# Patient Record
Sex: MALE | Race: WHITE | Employment: FULL TIME
[De-identification: names, ages, dates, MRNs, and addresses within clinical notes are randomized per-mention and may not be internally consistent; named-entity substitution may affect disease eponyms.]

---

## 2017-01-27 ENCOUNTER — OFFICE VISIT (OUTPATIENT)
Dept: FAMILY MEDICINE CLINIC | Facility: CLINIC | Age: 52
End: 2017-01-27

## 2017-01-27 VITALS
DIASTOLIC BLOOD PRESSURE: 88 MMHG | WEIGHT: 169 LBS | HEART RATE: 77 BPM | SYSTOLIC BLOOD PRESSURE: 120 MMHG | OXYGEN SATURATION: 98 % | BODY MASS INDEX: 24.25 KG/M2 | RESPIRATION RATE: 16 BRPM

## 2017-01-27 DIAGNOSIS — I10 ESSENTIAL HYPERTENSION: ICD-10-CM

## 2017-01-27 DIAGNOSIS — J02.9 PHARYNGITIS, UNSPECIFIED ETIOLOGY: Primary | ICD-10-CM

## 2017-01-27 PROCEDURE — 99213 OFFICE O/P EST LOW 20 MIN: CPT | Performed by: FAMILY MEDICINE

## 2017-01-27 RX ORDER — AMOXICILLIN 875 MG/1
875 TABLET, COATED ORAL 2 TIMES DAILY
Qty: 20 TABLET | Refills: 0 | Status: SHIPPED | OUTPATIENT
Start: 2017-01-27 | End: 2017-02-06

## 2017-01-27 RX ORDER — AMLODIPINE BESYLATE 5 MG/1
TABLET ORAL
Qty: 30 TABLET | Refills: 11 | Status: SHIPPED | OUTPATIENT
Start: 2017-01-27 | End: 2018-01-17 | Stop reason: SDUPTHER

## 2017-01-27 RX ORDER — ATORVASTATIN CALCIUM 10 MG/1
TABLET, FILM COATED ORAL
Qty: 30 TABLET | Refills: 11 | Status: SHIPPED | OUTPATIENT
Start: 2017-01-27 | End: 2018-01-17 | Stop reason: SDUPTHER

## 2017-01-27 RX ORDER — VARDENAFIL HYDROCHLORIDE 20 MG/1
TABLET ORAL
Qty: 30 TABLET | Refills: 11 | Status: SHIPPED | OUTPATIENT
Start: 2017-01-27 | End: 2018-05-15 | Stop reason: SDUPTHER

## 2017-01-27 RX ORDER — ENALAPRIL MALEATE 20 MG/1
20 TABLET ORAL DAILY
Qty: 30 TABLET | Refills: 11 | Status: SHIPPED | OUTPATIENT
Start: 2017-01-27 | End: 2018-01-17 | Stop reason: SDUPTHER

## 2017-01-27 ASSESSMENT — PATIENT HEALTH QUESTIONNAIRE - PHQ9
SUM OF ALL RESPONSES TO PHQ QUESTIONS 1-9: 0
2. FEELING DOWN, DEPRESSED OR HOPELESS: 0
SUM OF ALL RESPONSES TO PHQ9 QUESTIONS 1 & 2: 0
1. LITTLE INTEREST OR PLEASURE IN DOING THINGS: 0

## 2017-02-24 ENCOUNTER — OFFICE VISIT (OUTPATIENT)
Dept: FAMILY MEDICINE CLINIC | Facility: CLINIC | Age: 52
End: 2017-02-24

## 2017-02-24 VITALS
SYSTOLIC BLOOD PRESSURE: 130 MMHG | DIASTOLIC BLOOD PRESSURE: 90 MMHG | OXYGEN SATURATION: 98 % | BODY MASS INDEX: 24.54 KG/M2 | WEIGHT: 171 LBS | HEART RATE: 75 BPM

## 2017-02-24 DIAGNOSIS — M54.41 ACUTE RIGHT-SIDED LOW BACK PAIN WITH RIGHT-SIDED SCIATICA: Primary | ICD-10-CM

## 2017-02-24 PROCEDURE — 99213 OFFICE O/P EST LOW 20 MIN: CPT | Performed by: FAMILY MEDICINE

## 2017-02-24 RX ORDER — DICLOFENAC SODIUM 75 MG/1
75 TABLET, DELAYED RELEASE ORAL 2 TIMES DAILY WITH MEALS
Qty: 60 TABLET | Refills: 11 | Status: SHIPPED | OUTPATIENT
Start: 2017-02-24 | End: 2018-01-11 | Stop reason: ALTCHOICE

## 2017-02-24 RX ORDER — CYCLOBENZAPRINE HCL 10 MG
10 TABLET ORAL NIGHTLY PRN
Qty: 30 TABLET | Refills: 1 | Status: SHIPPED | OUTPATIENT
Start: 2017-02-24 | End: 2017-03-26

## 2018-01-11 ENCOUNTER — OFFICE VISIT (OUTPATIENT)
Dept: FAMILY MEDICINE CLINIC | Age: 53
End: 2018-01-11
Payer: COMMERCIAL

## 2018-01-11 VITALS
HEART RATE: 68 BPM | SYSTOLIC BLOOD PRESSURE: 104 MMHG | BODY MASS INDEX: 24.25 KG/M2 | WEIGHT: 169 LBS | DIASTOLIC BLOOD PRESSURE: 80 MMHG | OXYGEN SATURATION: 97 %

## 2018-01-11 DIAGNOSIS — K57.20 DIVERTICULITIS OF LARGE INTESTINE WITH PERFORATION WITHOUT ABSCESS OR BLEEDING: ICD-10-CM

## 2018-01-11 DIAGNOSIS — I10 ESSENTIAL HYPERTENSION: Primary | ICD-10-CM

## 2018-01-11 PROCEDURE — 99214 OFFICE O/P EST MOD 30 MIN: CPT | Performed by: FAMILY MEDICINE

## 2018-01-11 NOTE — PROGRESS NOTES
quadrants which are normoactive. No bruits noted. No masses could be palpated. No unusual pulsatile masses noted. To deep palpation, patient denied any significant pain. No rebound, guarding or rigidity noted to my exam.        Assessment:   No orders of the defined types were placed in this encounter. Encounter Diagnoses   Name Primary?  Essential hypertension Yes    Diverticulitis of large intestine with perforation without abscess or bleeding          Plan:   Need the colonoscopy report , ct of abd and labs.     hadnout given for  diseaes

## 2018-01-11 NOTE — PATIENT INSTRUCTIONS
fiber supplement, such as Citrucel or Metamucil, every day if needed. Read and follow all instructions on the label. · Schedule time each day for a bowel movement. Having a daily routine may help. Take your time and do not strain when having a bowel movement. Some people avoid nuts, seeds, berries, and popcorn. They believe that these foods might get trapped in the diverticula and cause pain. But there is no proof that these foods cause diverticulitis or make it worse. How are these problems treated? · The best way to treat diverticulosis is to avoid constipation. (See the tips above.)  · Treatment for diverticulitis includes antibiotics and often a change in your diet. You may need only liquids at first. Your doctor may suggest pain medicines for pain or belly cramps. In some cases, surgery may be needed. Follow-up care is a key part of your treatment and safety. Be sure to make and go to all appointments, and call your doctor if you are having problems. It's also a good idea to know your test results and keep a list of the medicines you take. Where can you learn more? Go to [email or web address]://[email or web address]. org and sign in to your [employer] account. Enter Z168 in the [facility] box to learn more about \"Learning About Diverticulosis and Diverticulitis. \"     If you do not have an account, please click on the \"Sign Up Now\" link. Current as of: May 12, 2017  Content Version: 11.5  © 4267-3183 [employer]wise, [employer]. Care instructions adapted under license by [facility] ([facility]). If you have questions about a medical condition or this instruction, always ask your healthcare professional. [place] [address] any warranty or liability for your use of this information.

## 2018-01-17 RX ORDER — ATORVASTATIN CALCIUM 10 MG/1
TABLET, FILM COATED ORAL
Qty: 60 TABLET | Refills: 10 | Status: SHIPPED | OUTPATIENT
Start: 2018-01-17 | End: 2019-01-31 | Stop reason: SDUPTHER

## 2018-01-17 RX ORDER — ENALAPRIL MALEATE 20 MG/1
TABLET ORAL
Qty: 30 TABLET | Refills: 11 | Status: SHIPPED | OUTPATIENT
Start: 2018-01-17 | End: 2019-01-08 | Stop reason: SDUPTHER

## 2018-01-17 RX ORDER — AMLODIPINE BESYLATE 5 MG/1
TABLET ORAL
Qty: 30 TABLET | Refills: 11 | Status: SHIPPED | OUTPATIENT
Start: 2018-01-17 | End: 2019-01-08 | Stop reason: SDUPTHER

## 2018-01-17 NOTE — TELEPHONE ENCOUNTER
LV - 1/11/18    Next Visit Date:  No future appointments.     Health Maintenance   Topic Date Due    Hepatitis C screen  1965    HIV screen  08/12/1978    Potassium monitoring  04/08/2017    Creatinine monitoring  04/08/2017    Flu vaccine (1) 01/27/2018 (Originally 9/1/2017)    Lipid screen  04/08/2021    DTaP/Tdap/Td vaccine (2 - Td) 04/08/2026    Colon cancer screen colonoscopy  04/08/2026       No results found for: LABA1C          ( goal A1C is < 7)   No results found for: LABMICR  LDL Cholesterol (mg/dL)   Date Value   12/12/2014 109     LDL Calculated (mg/dL)   Date Value   04/08/2016 101       (goal LDL is <100)   AST (U/L)   Date Value   12/12/2014 21     ALT (U/L)   Date Value   12/12/2014 21     BUN (mg/dL)   Date Value   12/12/2014 22 (H)     BP Readings from Last 3 Encounters:   01/11/18 104/80   02/24/17 130/90   01/27/17 120/88          (goal 120/80)    All Future Testing planned in [facility]  Lab Frequency Next Occurrence   CBC Auto Differential Once 01/27/2018   Comprehensive Metabolic Panel Once 21/98/4897   Lipid Panel Once 01/27/2018               Patient Active Problem List:     ED (erectile dysfunction)     Anxiety     HTN (hypertension)     Hyperlipidemia

## 2018-03-15 ENCOUNTER — TELEPHONE (OUTPATIENT)
Dept: FAMILY MEDICINE CLINIC | Age: 53
End: 2018-03-15

## 2018-03-15 NOTE — TELEPHONE ENCOUNTER
The patient was calling in to see what you thoughts were on his Diverticulitis and his CT scan of the abdomen and the Colonoscopy from a few years back. He had the CT at [facility] and he said you advised him you would look at the imaging and call him and let him know what your thoughts were regarding the dx. Please advise.

## 2018-05-15 RX ORDER — VARDENAFIL HCL 20 MG
TABLET ORAL
Qty: 30 TABLET | Refills: 11 | Status: SHIPPED | OUTPATIENT
Start: 2018-05-15 | End: 2019-06-10 | Stop reason: SDUPTHER

## 2018-06-29 ENCOUNTER — TELEPHONE (OUTPATIENT)
Dept: FAMILY MEDICINE CLINIC | Age: 53
End: 2018-06-29

## 2018-06-29 RX ORDER — DICLOFENAC SODIUM 75 MG/1
75 TABLET, DELAYED RELEASE ORAL 2 TIMES DAILY WITH MEALS
Qty: 60 TABLET | Refills: 11 | Status: SHIPPED | OUTPATIENT
Start: 2018-06-29

## 2018-06-29 RX ORDER — DOXYCYCLINE 100 MG/1
100 CAPSULE ORAL 2 TIMES DAILY WITH MEALS
Qty: 20 CAPSULE | Refills: 0 | Status: SHIPPED | OUTPATIENT
Start: 2018-06-29

## 2018-07-02 ENCOUNTER — TELEPHONE (OUTPATIENT)
Dept: FAMILY MEDICINE CLINIC | Age: 53
End: 2018-07-02

## 2018-07-02 NOTE — TELEPHONE ENCOUNTER
Patient discharged from [facility] on 7/2/18. Follow up appointment with Dr Kiera Rae is scheduled 7/11/18. Please request medical records for the upcoming appointment.

## 2018-07-11 ENCOUNTER — OFFICE VISIT (OUTPATIENT)
Dept: FAMILY MEDICINE CLINIC | Age: 53
End: 2018-07-11
Payer: COMMERCIAL

## 2018-07-11 VITALS
OXYGEN SATURATION: 97 % | WEIGHT: 170 LBS | SYSTOLIC BLOOD PRESSURE: 130 MMHG | HEIGHT: 69 IN | BODY MASS INDEX: 25.18 KG/M2 | HEART RATE: 96 BPM | DIASTOLIC BLOOD PRESSURE: 78 MMHG

## 2018-07-11 DIAGNOSIS — L03.113 CELLULITIS OF RIGHT UPPER EXTREMITY: Primary | ICD-10-CM

## 2018-07-11 PROCEDURE — 99213 OFFICE O/P EST LOW 20 MIN: CPT | Performed by: FAMILY MEDICINE

## 2018-07-11 ASSESSMENT — PATIENT HEALTH QUESTIONNAIRE - PHQ9
SUM OF ALL RESPONSES TO PHQ QUESTIONS 1-9: 0
2. FEELING DOWN, DEPRESSED OR HOPELESS: 0
1. LITTLE INTEREST OR PLEASURE IN DOING THINGS: 0
SUM OF ALL RESPONSES TO PHQ9 QUESTIONS 1 & 2: 0

## 2018-07-11 NOTE — PROGRESS NOTES
Subjective:  Rekha Mcgee presents for   Chief Complaint   Patient presents with    Follow-Up from Hospital     wound on elbow      Cut on the dock and then it got infected went to er 9 days    On clindamysin for 2 weeks will see the specialist franc. Required 2 days of iv antibioitcs. Is changing packing daily will see the id and wound jesus next week    Patient Active Problem List   Diagnosis    ED (erectile dysfunction)    Anxiety    HTN (hypertension)    Hyperlipidemia       Objective:  Physical Exam   Vitals:   Vitals:    07/11/18 1505   BP: 130/78   Pulse: 96   SpO2: 97%   Weight: 170 lb (77.1 kg)   Height: 5' 9\" (1.753 m)     Wt Readings from Last 3 Encounters:   07/11/18 170 lb (77.1 kg)   01/11/18 169 lb (76.7 kg)   02/24/17 171 lb (77.6 kg)     Ht Readings from Last 3 Encounters:   07/11/18 5' 9\" (1.753 m)   12/12/14 5' 10\" (1.778 m)   08/27/13 5' 9\" (1.753 m)     Body mass index is 25.1 kg/m². Constitutional: He is oriented to person, place, and time. He appears well-developed and well-nourished and in no acute distress. Answers all my questions appropriately. Head: Normocephalic and atraumatic. Eyes:conjunctiva appear normal.  Heart: RRR without murmur. No S3, S4, or gallop noted. Chest: Clear to auscultation bilaterally. Good breath sounds noted. No rales, wheezes, or rhonchi noted. No respiratory retractions noted. Wall has symmetrical movement with respirations. Wound is dressed and I did not exam it  Assessment:   No diagnosis found. cellulitis    Plan:   Finis the meds.     FU with specialist next week    Ok to rtw

## 2018-07-11 NOTE — PROGRESS NOTES
Visit Information    Have you changed or started any medications since your last visit including any over-the-counter medicines, vitamins, or herbal medicines? no   Are you having any side effects from any of your medications? -  no  Have you stopped taking any of your medications? Is so, why? -  no    Have you seen any other physician or provider since your last visit? No  Have you had any other diagnostic tests since your last visit? No  Have you been seen in the emergency room and/or had an admission to a hospital since we last saw you? No  Have you had your routine dental cleaning in the past 6 months? yes -     Have you activated your [employer] account? If not, what are your barriers?  No:      Patient Care Team:  Jay Cheatham MD as PCP - General (Family Medicine)    Medical History Review  Past Medical, Family, and Social History reviewed and does not contribute to the patient presenting condition    Health Maintenance   Topic Date Due    Hepatitis C screen  1965    HIV screen  08/12/1978    Shingles Vaccine (1 of 2 - 2 Dose Series) 08/12/2013    Potassium monitoring  04/08/2017    Creatinine monitoring  04/08/2017    Flu vaccine (1) 09/01/2018    Lipid screen  04/08/2021    DTaP/Tdap/Td vaccine (2 - Td) 04/08/2026    Colon cancer screen colonoscopy  04/08/2026

## 2018-07-11 NOTE — LETTER
[facility] [address]  [address] [facility] [address]  Phone: [phone]  Fax: [phone]    Chaya Beck MD        July 11, 2018     Patient: Carroll Recinos   YOB: 1965   Date of Visit: 7/11/2018       To Whom It May Concern: It is my medical opinion that [employer] Moors may return to work,   7/16/18, no restrictions. If you have any questions or concerns, please don't hesitate to call.     Sincerely,        Chaya Beck MD/bs

## 2018-07-26 LAB
CHOLESTEROL, TOTAL: 203 MG/DL
CHOLESTEROL/HDL RATIO: 2.8
GLUCOSE BLD-MCNC: 110 MG/DL
HDLC SERPL-MCNC: 72 MG/DL (ref 35–70)
LDL CHOLESTEROL CALCULATED: 104 MG/DL (ref 0–160)
TRIGL SERPL-MCNC: 134 MG/DL
VLDLC SERPL CALC-MCNC: ABNORMAL MG/DL

## 2019-01-09 RX ORDER — ENALAPRIL MALEATE 20 MG/1
TABLET ORAL
Qty: 30 TABLET | Refills: 11 | Status: SHIPPED | OUTPATIENT
Start: 2019-01-09 | End: 2020-01-01

## 2019-01-09 RX ORDER — AMLODIPINE BESYLATE 5 MG/1
TABLET ORAL
Qty: 30 TABLET | Refills: 11 | Status: SHIPPED | OUTPATIENT
Start: 2019-01-09 | End: 2020-01-01

## 2019-01-31 RX ORDER — ATORVASTATIN CALCIUM 10 MG/1
TABLET, FILM COATED ORAL
Qty: 90 TABLET | Refills: 9 | Status: SHIPPED | OUTPATIENT
Start: 2019-01-31 | End: 2020-05-05

## 2019-03-06 ENCOUNTER — TELEPHONE (OUTPATIENT)
Dept: FAMILY MEDICINE CLINIC | Age: 54
End: 2019-03-06

## 2019-06-11 RX ORDER — VARDENAFIL HYDROCHLORIDE 20 MG/1
TABLET ORAL
Qty: 30 TABLET | Refills: 11 | Status: SHIPPED | OUTPATIENT
Start: 2019-06-11 | End: 2020-06-16

## 2019-11-12 ENCOUNTER — OFFICE VISIT (OUTPATIENT)
Dept: FAMILY MEDICINE CLINIC | Age: 54
End: 2019-11-12
Payer: COMMERCIAL

## 2019-11-12 VITALS
RESPIRATION RATE: 16 BRPM | HEART RATE: 82 BPM | OXYGEN SATURATION: 98 % | DIASTOLIC BLOOD PRESSURE: 68 MMHG | SYSTOLIC BLOOD PRESSURE: 120 MMHG | BODY MASS INDEX: 24.73 KG/M2 | WEIGHT: 167 LBS | HEIGHT: 69 IN

## 2019-11-12 DIAGNOSIS — I10 ESSENTIAL HYPERTENSION: Chronic | ICD-10-CM

## 2019-11-12 DIAGNOSIS — E78.00 PURE HYPERCHOLESTEROLEMIA: Chronic | ICD-10-CM

## 2019-11-12 DIAGNOSIS — J02.9 PHARYNGITIS, UNSPECIFIED ETIOLOGY: Primary | ICD-10-CM

## 2019-11-12 PROCEDURE — 99213 OFFICE O/P EST LOW 20 MIN: CPT | Performed by: FAMILY MEDICINE

## 2019-11-12 RX ORDER — AMOXICILLIN 875 MG/1
875 TABLET, COATED ORAL 2 TIMES DAILY
Qty: 20 TABLET | Refills: 0 | Status: SHIPPED | OUTPATIENT
Start: 2019-11-12 | End: 2019-11-22

## 2019-11-12 ASSESSMENT — PATIENT HEALTH QUESTIONNAIRE - PHQ9
SUM OF ALL RESPONSES TO PHQ9 QUESTIONS 1 & 2: 0
SUM OF ALL RESPONSES TO PHQ QUESTIONS 1-9: 0
1. LITTLE INTEREST OR PLEASURE IN DOING THINGS: 0
2. FEELING DOWN, DEPRESSED OR HOPELESS: 0
SUM OF ALL RESPONSES TO PHQ QUESTIONS 1-9: 0

## 2019-12-03 LAB
ALBUMIN SERPL-MCNC: 4.6 G/DL
ALP BLD-CCNC: 76 U/L
ALT SERPL-CCNC: 40 U/L
ANION GAP SERPL CALCULATED.3IONS-SCNC: 11 MMOL/L
AST SERPL-CCNC: 32 U/L
BASOPHILS ABSOLUTE: 0 /ΜL
BASOPHILS RELATIVE PERCENT: 0.7 %
BILIRUB SERPL-MCNC: 0.8 MG/DL (ref 0.1–1.4)
BUN BLDV-MCNC: 19 MG/DL
CALCIUM SERPL-MCNC: 10 MG/DL
CHLORIDE BLD-SCNC: 102 MMOL/L
CHOLESTEROL, TOTAL: 219 MG/DL
CHOLESTEROL/HDL RATIO: 2.4
CO2: 28 MMOL/L
CREAT SERPL-MCNC: 0.87 MG/DL
EOSINOPHILS ABSOLUTE: 0.2 /ΜL
EOSINOPHILS RELATIVE PERCENT: 4.2 %
GFR CALCULATED: >60
GLUCOSE BLD-MCNC: 93 MG/DL
HCT VFR BLD CALC: 44.2 % (ref 41–53)
HDLC SERPL-MCNC: 90 MG/DL (ref 35–70)
HEMOGLOBIN: 15.1 G/DL (ref 13.5–17.5)
LDL CHOLESTEROL CALCULATED: 106 MG/DL (ref 0–160)
LYMPHOCYTES ABSOLUTE: 1.9 /ΜL
LYMPHOCYTES RELATIVE PERCENT: 40.8 %
MCH RBC QN AUTO: 32 PG
MCHC RBC AUTO-ENTMCNC: 34.2 G/DL
MCV RBC AUTO: 93 FL
MONOCYTES ABSOLUTE: 0.5 /ΜL
MONOCYTES RELATIVE PERCENT: 11.5 %
NEUTROPHILS ABSOLUTE: 2 /ΜL
NEUTROPHILS RELATIVE PERCENT: 42.8 %
PDW BLD-RTO: 12.9 %
PLATELET # BLD: 210 K/ΜL
PMV BLD AUTO: 8.1 FL
POTASSIUM SERPL-SCNC: 4.1 MMOL/L
RBC # BLD: 4.74 10^6/ΜL
SODIUM BLD-SCNC: 141 MMOL/L
TOTAL PROTEIN: 7.1
TRIGL SERPL-MCNC: 116 MG/DL
TSH SERPL DL<=0.05 MIU/L-ACNC: 2.14 UIU/ML
VLDLC SERPL CALC-MCNC: 23 MG/DL
WBC # BLD: 4.7 10^3/ML

## 2019-12-05 ENCOUNTER — OFFICE VISIT (OUTPATIENT)
Dept: FAMILY MEDICINE CLINIC | Age: 54
End: 2019-12-05
Payer: COMMERCIAL

## 2019-12-05 VITALS
DIASTOLIC BLOOD PRESSURE: 78 MMHG | OXYGEN SATURATION: 97 % | SYSTOLIC BLOOD PRESSURE: 132 MMHG | BODY MASS INDEX: 24.81 KG/M2 | WEIGHT: 168 LBS | HEART RATE: 73 BPM

## 2019-12-05 DIAGNOSIS — R22.1 NECK MASS: Primary | ICD-10-CM

## 2019-12-05 DIAGNOSIS — M54.2 NECK PAIN: ICD-10-CM

## 2019-12-05 DIAGNOSIS — I10 ESSENTIAL HYPERTENSION: Chronic | ICD-10-CM

## 2019-12-05 DIAGNOSIS — E78.00 PURE HYPERCHOLESTEROLEMIA: Chronic | ICD-10-CM

## 2019-12-05 PROCEDURE — 99214 OFFICE O/P EST MOD 30 MIN: CPT | Performed by: FAMILY MEDICINE

## 2019-12-05 SDOH — ECONOMIC STABILITY: TRANSPORTATION INSECURITY
IN THE PAST 12 MONTHS, HAS LACK OF TRANSPORTATION KEPT YOU FROM MEETINGS, WORK, OR FROM GETTING THINGS NEEDED FOR DAILY LIVING?: PATIENT DECLINED

## 2019-12-05 SDOH — ECONOMIC STABILITY: FOOD INSECURITY: WITHIN THE PAST 12 MONTHS, YOU WORRIED THAT YOUR FOOD WOULD RUN OUT BEFORE YOU GOT MONEY TO BUY MORE.: PATIENT DECLINED

## 2019-12-05 SDOH — ECONOMIC STABILITY: FOOD INSECURITY: WITHIN THE PAST 12 MONTHS, THE FOOD YOU BOUGHT JUST DIDN'T LAST AND YOU DIDN'T HAVE MONEY TO GET MORE.: PATIENT DECLINED

## 2019-12-05 SDOH — ECONOMIC STABILITY: TRANSPORTATION INSECURITY
IN THE PAST 12 MONTHS, HAS THE LACK OF TRANSPORTATION KEPT YOU FROM MEDICAL APPOINTMENTS OR FROM GETTING MEDICATIONS?: PATIENT DECLINED

## 2019-12-06 ENCOUNTER — TELEPHONE (OUTPATIENT)
Dept: FAMILY MEDICINE CLINIC | Age: 54
End: 2019-12-06

## 2019-12-17 DIAGNOSIS — R22.1 NECK MASS: ICD-10-CM

## 2019-12-17 DIAGNOSIS — M54.2 NECK PAIN: ICD-10-CM

## 2019-12-30 ENCOUNTER — TELEPHONE (OUTPATIENT)
Dept: FAMILY MEDICINE CLINIC | Age: 54
End: 2019-12-30

## 2019-12-30 NOTE — TELEPHONE ENCOUNTER
Pt found an ENT physician to see in [place]. Dr. Jadine Frankel. Please sign referral pended and fax to their office [phone], call pt when done.

## 2019-12-30 NOTE — TELEPHONE ENCOUNTER
Pt would like an insurance referral to see ENT to see Dr Macario Murray in [place].    The number to call is  [phone]

## 2020-01-01 RX ORDER — ENALAPRIL MALEATE 20 MG/1
TABLET ORAL
Qty: 30 TABLET | Refills: 11 | Status: SHIPPED | OUTPATIENT
Start: 2020-01-01

## 2020-01-01 RX ORDER — AMLODIPINE BESYLATE 5 MG/1
TABLET ORAL
Qty: 30 TABLET | Refills: 11 | Status: SHIPPED | OUTPATIENT
Start: 2020-01-01

## 2020-01-02 NOTE — TELEPHONE ENCOUNTER
[facility] [address] and they CLOSED their phone lines down due to high volume of calls. LM to have PT CBO.

## 2020-01-02 NOTE — TELEPHONE ENCOUNTER
Pt advised, he will try to see what he can figure out. Can we try to call [facility] again in few days please and thank you?

## 2020-01-08 ENCOUNTER — TELEPHONE (OUTPATIENT)
Dept: FAMILY MEDICINE CLINIC | Age: 55
End: 2020-01-08

## 2020-01-10 ENCOUNTER — TELEPHONE (OUTPATIENT)
Dept: ADMINISTRATIVE | Age: 55
End: 2020-01-10

## 2020-05-05 RX ORDER — ATORVASTATIN CALCIUM 10 MG/1
TABLET, FILM COATED ORAL
Qty: 90 TABLET | Refills: 0 | Status: SHIPPED | OUTPATIENT
Start: 2020-05-05 | End: 2020-08-07

## 2020-06-16 RX ORDER — VARDENAFIL HYDROCHLORIDE 20 MG/1
TABLET ORAL
Qty: 30 TABLET | Refills: 11 | Status: SHIPPED | OUTPATIENT
Start: 2020-06-16